# Patient Record
Sex: FEMALE | Race: BLACK OR AFRICAN AMERICAN
[De-identification: names, ages, dates, MRNs, and addresses within clinical notes are randomized per-mention and may not be internally consistent; named-entity substitution may affect disease eponyms.]

---

## 2020-10-27 ENCOUNTER — HOSPITAL ENCOUNTER (EMERGENCY)
Dept: HOSPITAL 61 - ER | Age: 35
Discharge: HOME | End: 2020-10-27
Payer: COMMERCIAL

## 2020-10-27 VITALS — HEIGHT: 67 IN | WEIGHT: 176.37 LBS | BODY MASS INDEX: 27.68 KG/M2

## 2020-10-27 VITALS — DIASTOLIC BLOOD PRESSURE: 97 MMHG | SYSTOLIC BLOOD PRESSURE: 162 MMHG

## 2020-10-27 DIAGNOSIS — R55: ICD-10-CM
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
## 2020-10-27 DIAGNOSIS — S06.0X0A: Primary | ICD-10-CM

## 2020-10-27 DIAGNOSIS — H11.33: ICD-10-CM
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
## 2020-10-27 DIAGNOSIS — F17.200: ICD-10-CM

## 2020-10-27 LAB
AMPHETAMINE/METHAMPHETAMINE: (no result)
BARBITURATES UR-MCNC: (no result) UG/ML
BENZODIAZ UR-MCNC: (no result) UG/L
CANNABINOIDS UR-MCNC: (no result) UG/L
COCAINE UR-MCNC: (no result) NG/ML
METHADONE SERPL-MCNC: (no result) NG/ML
OPIATES UR-MCNC: (no result) NG/ML
PCP SERPL-MCNC: (no result) MG/DL

## 2020-10-27 PROCEDURE — 80307 DRUG TEST PRSMV CHEM ANLYZR: CPT

## 2020-10-27 PROCEDURE — 70486 CT MAXILLOFACIAL W/O DYE: CPT

## 2020-10-27 PROCEDURE — 70450 CT HEAD/BRAIN W/O DYE: CPT

## 2020-10-27 PROCEDURE — 81025 URINE PREGNANCY TEST: CPT
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
